# Patient Record
Sex: FEMALE | Race: BLACK OR AFRICAN AMERICAN | Employment: FULL TIME | URBAN - METROPOLITAN AREA
[De-identification: names, ages, dates, MRNs, and addresses within clinical notes are randomized per-mention and may not be internally consistent; named-entity substitution may affect disease eponyms.]

---

## 2021-04-08 ENCOUNTER — TRANSCRIBE ORDER (OUTPATIENT)
Dept: SCHEDULING | Age: 66
End: 2021-04-08

## 2021-04-08 DIAGNOSIS — I25.10 CORONARY ATHEROSCLEROSIS OF NATIVE CORONARY ARTERY: Primary | ICD-10-CM

## 2021-04-08 DIAGNOSIS — I25.3 ANEURYSM OF HEART WALL: ICD-10-CM

## 2021-04-15 ENCOUNTER — TRANSCRIBE ORDER (OUTPATIENT)
Dept: SCHEDULING | Age: 66
End: 2021-04-15

## 2021-04-15 DIAGNOSIS — I71.20 ANEURYSM, AORTA, THORACIC: Primary | ICD-10-CM

## 2021-04-16 ENCOUNTER — TRANSCRIBE ORDER (OUTPATIENT)
Dept: SCHEDULING | Age: 66
End: 2021-04-16

## 2021-04-16 DIAGNOSIS — I71.20 THORACIC AORTIC ANEURYSM: Primary | ICD-10-CM

## 2021-04-27 ENCOUNTER — HOSPITAL ENCOUNTER (OUTPATIENT)
Dept: CT IMAGING | Age: 66
Discharge: HOME | End: 2021-04-27
Payer: MEDICARE

## 2021-04-27 DIAGNOSIS — I25.10 CORONARY ATHEROSCLEROSIS OF NATIVE CORONARY ARTERY: ICD-10-CM

## 2021-04-27 DIAGNOSIS — I25.3 ANEURYSM OF HEART WALL: ICD-10-CM

## 2021-04-27 LAB — CREAT BLD-MCNC: 0.9 MG/DL (ref 0.6–1.3)

## 2021-04-27 PROCEDURE — 71275 CT ANGIOGRAPHY CHEST: CPT

## 2021-04-27 PROCEDURE — 74011000636 HC RX REV CODE- 636

## 2021-04-27 PROCEDURE — 82565 ASSAY OF CREATININE: CPT

## 2021-04-27 RX ADMIN — IOPAMIDOL 100 ML: 755 INJECTION, SOLUTION INTRAVENOUS at 09:08

## 2021-04-30 ENCOUNTER — TRANSCRIBE ORDER (OUTPATIENT)
Dept: SCHEDULING | Age: 66
End: 2021-04-30

## 2021-04-30 DIAGNOSIS — Z12.31 OTHER SCREENING MAMMOGRAM: Primary | ICD-10-CM

## 2021-07-12 ENCOUNTER — TRANSCRIBE ORDER (OUTPATIENT)
Dept: SCHEDULING | Age: 66
End: 2021-07-12

## 2021-07-12 DIAGNOSIS — Q21.0 VENTRICULAR SEPTAL DEFECT: Primary | ICD-10-CM

## 2021-07-20 ENCOUNTER — TRANSCRIBE ORDER (OUTPATIENT)
Dept: SCHEDULING | Age: 66
End: 2021-07-20

## 2021-07-20 DIAGNOSIS — Q21.0 VENTRICULAR SEPTAL DEFECT: Primary | ICD-10-CM

## 2021-07-28 ENCOUNTER — HOSPITAL ENCOUNTER (OUTPATIENT)
Dept: NON INVASIVE DIAGNOSTICS | Age: 66
Discharge: HOME | End: 2021-07-28
Attending: INTERNAL MEDICINE
Payer: MEDICARE

## 2021-07-28 ENCOUNTER — HOSPITAL ENCOUNTER (EMERGENCY)
Age: 66
Discharge: STILL ADMITTED OR ADMINISTRATIVE | End: 2021-07-28
Attending: EMERGENCY MEDICINE

## 2021-07-28 VITALS — HEIGHT: 65 IN | BODY MASS INDEX: 30.32 KG/M2 | WEIGHT: 182 LBS

## 2021-07-28 DIAGNOSIS — Q21.0 VENTRICULAR SEPTAL DEFECT: ICD-10-CM

## 2021-07-28 PROCEDURE — 93306 TTE W/DOPPLER COMPLETE: CPT | Performed by: SPECIALIST

## 2021-07-28 PROCEDURE — 93306 TTE W/DOPPLER COMPLETE: CPT

## 2021-07-29 LAB
ECHO AO ROOT DIAM: 2.5 CM
ECHO AV AREA PLAN: 1.89 CM2
ECHO LA AREA 4C: 17.98 CM2
ECHO LA MAJOR AXIS: 3.45 CM
ECHO LA MINOR AXIS: 1.82 CM
ECHO LA VOL 4C: 43.75 ML (ref 22–52)
ECHO LA VOLUME INDEX A4C: 23.03 ML/M2 (ref 16–28)
ECHO LV EDV A4C: 122.19 ML
ECHO LV EDV INDEX A4C: 64.3 ML/M2
ECHO LV EJECTION FRACTION A4C: 81 PERCENT
ECHO LV ESV A4C: 23.02 ML
ECHO LV ESV INDEX A4C: 12.1 ML/M2
ECHO LV INTERNAL DIMENSION DIASTOLIC: 4.06 CM (ref 3.9–5.3)
ECHO LV INTERNAL DIMENSION SYSTOLIC: 2.01 CM
ECHO LV IVSD: 1.05 CM (ref 0.6–0.9)
ECHO LV MASS 2D: 132.8 G (ref 67–162)
ECHO LV MASS INDEX 2D: 69.9 G/M2 (ref 43–95)
ECHO LV POSTERIOR WALL DIASTOLIC: 0.98 CM (ref 0.6–0.9)
ECHO LVOT DIAM: 1.82 CM
ECHO LVOT PEAK GRADIENT: 3.81 MMHG
ECHO LVOT PEAK VELOCITY: 97.61 CM/S
ECHO MV A VELOCITY: 67.54 CM/S
ECHO MV AREA PHT: 3.86 CM2
ECHO MV AREA PHT: 4.89 CM2
ECHO MV AREA PLAN: 4.81 CM2
ECHO MV E DECELERATION TIME (DT): 196.52 MS
ECHO MV E VELOCITY: 40.35 CM/S
ECHO MV E/A RATIO: 0.6
ECHO MV MAX VELOCITY: 94.78 CM/S
ECHO MV MEAN GRADIENT: 1.09 MMHG
ECHO MV PEAK GRADIENT: 3.59 MMHG
ECHO MV PRESSURE HALF TIME (PHT): 45.01 MS
ECHO MV PRESSURE HALF TIME (PHT): 56.99 MS
ECHO MV VTI: 17.88 CM
ECHO PV PEAK INSTANTANEOUS GRADIENT SYSTOLIC: 3.04 MMHG
ECHO RA AREA 4C: 12.54 CM2
ECHO TV REGURGITANT MAX VELOCITY: 132.48 CM/S

## 2021-08-06 ENCOUNTER — HOSPITAL ENCOUNTER (OUTPATIENT)
Dept: PREADMISSION TESTING | Age: 66
Discharge: HOME | End: 2021-08-06

## 2022-04-29 ENCOUNTER — HOSPITAL ENCOUNTER (OUTPATIENT)
Dept: MAMMOGRAPHY | Age: 67
Discharge: HOME | End: 2022-04-29
Payer: MEDICARE

## 2022-04-29 DIAGNOSIS — Z12.31 OTHER SCREENING MAMMOGRAM: ICD-10-CM

## 2022-04-29 PROCEDURE — 77067 SCR MAMMO BI INCL CAD: CPT

## 2022-09-11 ENCOUNTER — ANESTHESIA EVENT (OUTPATIENT)
Dept: ENDOSCOPY | Age: 67
End: 2022-09-11
Payer: MEDICARE

## 2022-09-11 NOTE — ANESTHESIA PREPROCEDURE EVALUATION
Relevant Problems   No relevant active problems       Anesthetic History   No history of anesthetic complications            Review of Systems / Medical History  Patient summary reviewed, nursing notes reviewed and pertinent labs reviewed    Pulmonary  Within defined limits                 Neuro/Psych   Within defined limits           Cardiovascular      Valvular problems/murmurs: aortic insufficiency            Exercise tolerance: >4 METS  Comments: 60-65% EF with mild AI by ECHO 7/28/21   GI/Hepatic/Renal  Within defined limits              Endo/Other        Arthritis     Other Findings   Comments: Screening colonoscopy           Physical Exam    Airway  Mallampati: I  TM Distance: > 6 cm  Neck ROM: normal range of motion   Mouth opening: Normal     Cardiovascular    Rhythm: regular  Rate: normal         Dental    Dentition: Poor dentition  Comments:  Many missing, denies any loose   Pulmonary  Breath sounds clear to auscultation               Abdominal  GI exam deferred       Other Findings            Anesthetic Plan    ASA: 2  Anesthesia type: total IV anesthesia and general          Induction: Intravenous  Anesthetic plan and risks discussed with: Patient

## 2022-09-12 ENCOUNTER — ANESTHESIA (OUTPATIENT)
Dept: ENDOSCOPY | Age: 67
End: 2022-09-12
Payer: MEDICARE

## 2022-09-12 ENCOUNTER — HOSPITAL ENCOUNTER (OUTPATIENT)
Age: 67
Setting detail: OUTPATIENT SURGERY
Discharge: HOME | End: 2022-09-12
Attending: INTERNAL MEDICINE | Admitting: INTERNAL MEDICINE
Payer: MEDICARE

## 2022-09-12 VITALS
SYSTOLIC BLOOD PRESSURE: 147 MMHG | TEMPERATURE: 97.5 F | DIASTOLIC BLOOD PRESSURE: 85 MMHG | HEART RATE: 59 BPM | RESPIRATION RATE: 19 BRPM | OXYGEN SATURATION: 100 %

## 2022-09-12 PROCEDURE — 76060000031 HC ANESTHESIA FIRST 0.5 HR: Performed by: INTERNAL MEDICINE

## 2022-09-12 PROCEDURE — 74011250636 HC RX REV CODE- 250/636: Performed by: NURSE ANESTHETIST, CERTIFIED REGISTERED

## 2022-09-12 PROCEDURE — 74011250637 HC RX REV CODE- 250/637: Performed by: INTERNAL MEDICINE

## 2022-09-12 PROCEDURE — 74011000258 HC RX REV CODE- 258: Performed by: NURSE ANESTHETIST, CERTIFIED REGISTERED

## 2022-09-12 PROCEDURE — 74011250636 HC RX REV CODE- 250/636: Performed by: INTERNAL MEDICINE

## 2022-09-12 PROCEDURE — 2709999900 HC NON-CHARGEABLE SUPPLY: Performed by: INTERNAL MEDICINE

## 2022-09-12 PROCEDURE — 74011000250 HC RX REV CODE- 250: Performed by: NURSE ANESTHETIST, CERTIFIED REGISTERED

## 2022-09-12 PROCEDURE — 76040000019: Performed by: INTERNAL MEDICINE

## 2022-09-12 RX ORDER — DEXTROMETHORPHAN/PSEUDOEPHED 2.5-7.5/.8
1.2 DROPS ORAL
Status: DISCONTINUED | OUTPATIENT
Start: 2022-09-12 | End: 2022-09-12 | Stop reason: HOSPADM

## 2022-09-12 RX ORDER — FLUMAZENIL 0.1 MG/ML
0.2 INJECTION INTRAVENOUS
Status: DISCONTINUED | OUTPATIENT
Start: 2022-09-12 | End: 2022-09-12 | Stop reason: HOSPADM

## 2022-09-12 RX ORDER — SODIUM CHLORIDE 9 MG/ML
75 INJECTION, SOLUTION INTRAVENOUS CONTINUOUS
Status: DISCONTINUED | OUTPATIENT
Start: 2022-09-12 | End: 2022-09-12 | Stop reason: HOSPADM

## 2022-09-12 RX ORDER — LOSARTAN POTASSIUM 25 MG/1
25 TABLET ORAL DAILY
COMMUNITY

## 2022-09-12 RX ORDER — LIDOCAINE HYDROCHLORIDE 20 MG/ML
INJECTION, SOLUTION EPIDURAL; INFILTRATION; INTRACAUDAL; PERINEURAL AS NEEDED
Status: DISCONTINUED | OUTPATIENT
Start: 2022-09-12 | End: 2022-09-12 | Stop reason: HOSPADM

## 2022-09-12 RX ORDER — ATROPINE SULFATE 0.1 MG/ML
0.5 INJECTION INTRAVENOUS
Status: DISCONTINUED | OUTPATIENT
Start: 2022-09-12 | End: 2022-09-12 | Stop reason: HOSPADM

## 2022-09-12 RX ORDER — MECLIZINE HYDROCHLORIDE 25 MG/1
25 TABLET ORAL
COMMUNITY

## 2022-09-12 RX ORDER — EPINEPHRINE 0.1 MG/ML
1 INJECTION INTRACARDIAC; INTRAVENOUS
Status: DISCONTINUED | OUTPATIENT
Start: 2022-09-12 | End: 2022-09-12 | Stop reason: HOSPADM

## 2022-09-12 RX ORDER — SODIUM CHLORIDE 0.9 % (FLUSH) 0.9 %
5-40 SYRINGE (ML) INJECTION EVERY 8 HOURS
Status: DISCONTINUED | OUTPATIENT
Start: 2022-09-12 | End: 2022-09-12 | Stop reason: HOSPADM

## 2022-09-12 RX ORDER — SODIUM CHLORIDE 9 MG/ML
INJECTION, SOLUTION INTRAVENOUS
Status: DISCONTINUED | OUTPATIENT
Start: 2022-09-12 | End: 2022-09-12 | Stop reason: HOSPADM

## 2022-09-12 RX ORDER — PROPOFOL 10 MG/ML
INJECTION, EMULSION INTRAVENOUS AS NEEDED
Status: DISCONTINUED | OUTPATIENT
Start: 2022-09-12 | End: 2022-09-12 | Stop reason: HOSPADM

## 2022-09-12 RX ORDER — NALOXONE HYDROCHLORIDE 0.4 MG/ML
0.4 INJECTION, SOLUTION INTRAMUSCULAR; INTRAVENOUS; SUBCUTANEOUS
Status: DISCONTINUED | OUTPATIENT
Start: 2022-09-12 | End: 2022-09-12 | Stop reason: HOSPADM

## 2022-09-12 RX ORDER — SODIUM CHLORIDE 0.9 % (FLUSH) 0.9 %
5-40 SYRINGE (ML) INJECTION AS NEEDED
Status: DISCONTINUED | OUTPATIENT
Start: 2022-09-12 | End: 2022-09-12 | Stop reason: HOSPADM

## 2022-09-12 RX ADMIN — PROPOFOL 50 MG: 10 INJECTION, EMULSION INTRAVENOUS at 10:09

## 2022-09-12 RX ADMIN — PROPOFOL 20 MG: 10 INJECTION, EMULSION INTRAVENOUS at 10:12

## 2022-09-12 RX ADMIN — SODIUM CHLORIDE: 9 INJECTION, SOLUTION INTRAVENOUS at 10:06

## 2022-09-12 RX ADMIN — SODIUM CHLORIDE 75 ML/HR: 9 INJECTION, SOLUTION INTRAVENOUS at 10:05

## 2022-09-12 RX ADMIN — PROPOFOL 130 MG: 10 INJECTION, EMULSION INTRAVENOUS at 10:05

## 2022-09-12 RX ADMIN — SIMETHICONE 80 MG: 20 SUSPENSION/ DROPS ORAL at 10:12

## 2022-09-12 RX ADMIN — LIDOCAINE HYDROCHLORIDE 40 MG: 20 INJECTION, SOLUTION EPIDURAL; INFILTRATION; INTRACAUDAL; PERINEURAL at 10:05

## 2022-09-12 NOTE — ANESTHESIA POSTPROCEDURE EVALUATION
Procedure(s):  COLONOSCOPY.    total IV anesthesia, general    Anesthesia Post Evaluation        Patient location during evaluation: PACU  Note status: Adequate. Level of consciousness: responsive to verbal stimuli and sleepy but conscious  Pain management: satisfactory to patient  Airway patency: patent  Anesthetic complications: no  Cardiovascular status: acceptable  Respiratory status: acceptable  Hydration status: acceptable  Comments: +Post-Anesthesia Evaluation and Assessment    Patient: Evelin Leon MRN: [id number]  SSN: [id number]   YOB: 1955  Age: 75 y.o. Sex: female      Cardiovascular Function/Vital Signs    BP (!) 147/85   Pulse (!) 59   Temp 36.4 °C (97.5 °F)   Resp 19   SpO2 100%     Patient is status post Procedure(s):  COLONOSCOPY. Nausea/Vomiting: Controlled. Postoperative hydration reviewed and adequate. Pain:  Pain Scale 1: Numeric (0 - 10) (09/12/22 1030)  Pain Intensity 1: 0 (09/12/22 1030)   Managed. Neurological Status: At baseline. Mental Status and Level of Consciousness: Arousable. Pulmonary Status:   O2 Device: None (Room air) (09/12/22 1030)   Adequate oxygenation and airway patent. Complications related to anesthesia: None    Post-anesthesia assessment completed. No concerns. Signed By: Aruna Nielson MD    9/12/2022  Post anesthesia nausea and vomiting:  controlled      INITIAL Post-op Vital signs:   Vitals Value Taken Time   /85 09/12/22 1030   Temp 36.4 °C (97.5 °F) 09/12/22 1022   Pulse 56 09/12/22 1034   Resp 19 09/12/22 1034   SpO2 99 % 09/12/22 1034   Vitals shown include unvalidated device data.

## 2022-09-12 NOTE — PROGRESS NOTES
Endoscope was pre-cleaned at bedside immediately following procedure by SHENA Riley Glasses returned to patient post procedure.
Reno Zaragoza  1955  [id number]    Situation:  Verbal report received from: Mendel Kayser, RN  Procedure: Procedure(s):  COLONOSCOPY    Background:    Preoperative diagnosis: screen  Postoperative diagnosis: Hemorrhoids     :  Dr. Mana Lancaster  Assistant(s): Endoscopy Technician-1: Sana Serrano  Endoscopy RN-1: Yanely Hendrickson    Specimens: * No specimens in log *  H. Pylori  no    Assessment:  Intra-procedure medications     Anesthesia gave intra-procedure sedation and medications, see anesthesia flow sheet yes    Intravenous fluids: NS@ KVO     Vital signs stable yes    Abdominal assessment: round and soft yes    Recommendation:  Discharge patient per MD order yes.   Return to floor n/a  Family or Brelechandra Chairezon, son  Permission to share finding with family or friend yes
Adult

## 2022-09-12 NOTE — DISCHARGE INSTRUCTIONS
Wilda Do  [id number]  1955    COLON DISCHARGE INSTRUCTIONS  Discomfort:  Redness at IV site- apply warm compress to area; if redness or soreness persist- contact your physician  There may be a slight amount of blood passed from the rectum  Gaseous discomfort- walking, belching will help relieve any discomfort  You may not operate a vehicle for 12 hours  You may not engage in an occupation involving machinery or appliances for rest of today  You may not drink alcoholic beverages for at least 12 hours  Avoid making any critical decisions for at least 24 hour  DIET:   Regular diet. - however -  remember your colon is empty and a heavy meal will produce gas. Avoid these foods:  vegetables, fried / greasy foods, carbonated drinks for today  MEDICATION:  Per Medication Reconciliation       ACTIVITY:  You may not resume your normal daily activities until tomorrow AM; it is recommended that you spend the remainder of the day resting -  avoid any strenuous activity. CALL M.D. ANY SIGN OF:   Increasing pain, nausea, vomiting  Abdominal distension (swelling)  New increased bleeding (oral or rectal)  Fever (chills)  Pain in chest area  Bloody discharge from nose or mouth  Shortness of breath    You may  take any Advil, Aspirin, Ibuprofen, Motrin, Aleve, or Goodys for 10 days, ONLY  Tylenol as needed for pain.     IMPRESSION:  Impression:    Normal colonoscopy through to the cecum  Medium sized internal hemorrhoids seen on retroflexion    Recommendations:   Consider repeat colonoscopy in 10 years for average risk screening based on overall health status    Follow-up Instructions:  Telephone # [phone]      Pancho Mast MD

## 2022-09-12 NOTE — PROCEDURES
NAME:  Estevan Duque   :   1955   MRN:   [id number]     Date/Time:  2022 10:17 AM    Colonoscopy Operative Report    Procedure Type:   Colonoscopy --screening     Indications:     Screening colonoscopy  Pre-operative Diagnosis: see indication above  Post-operative Diagnosis:  See findings below  :  Pat Gutierrez MD  Referring Provider: --Maryanne Arguello MD    Exam:  Airway: clear, no airway problems anticipated  Heart: RRR, without gallops or rubs  Lungs: clear bilaterally without wheezes, crackles, or rhonchi  Abdomen: soft, nontender, nondistended, bowel sounds present  Mental Status: awake, alert and oriented to person, place and time    Sedation:  MAC anesthesia Propofol  Procedure Details:  After informed consent was obtained with all risks and benefits of procedure explained and preoperative exam completed, the patient was taken to the endoscopy suite and placed in the left lateral decubitus position. Upon sequential sedation as per above, a digital rectal exam was performed demonstrating internal hemorrhoids. The Olympus videocolonoscope  was inserted in the rectum and carefully advanced to the cecum, which was identified by the ileocecal valve and appendiceal orifice. The quality of preparation was fair. The colonoscope was slowly withdrawn with careful evaluation between folds. Retroflexion in the rectum was completed demonstrating internal hemorrhoids. Findings:   Normal colonoscopy through to the cecum  Medium sized internal hemorrhoids seen on retroflexion    Specimen Removed:  None  Complications: None. EBL:  None. Impression:    Normal colonoscopy through to the cecum  Medium sized internal hemorrhoids seen on retroflexion    Recommendations:   Consider repeat colonoscopy in 10 years for average risk screening based on overall health status    Discharge Disposition:  Home in the company of a  when able to ambulate.       Radha Rosa MD

## 2022-09-12 NOTE — H&P
Gastroenterology Outpatient History and Physical    Patient: Reno Zaragoza    Physician: Sobia Ibarra MD    Chief Complaint: CRC screening  History of Present Illness: No GI complaints    History:  Past Medical History:   Diagnosis Date    Arthritis       Past Surgical History:   Procedure Laterality Date    HX  SECTION      HX CYST REMOVAL Bilateral     HX GYN        Social History     Socioeconomic History    Marital status: SINGLE   Tobacco Use    Smoking status: Never    Smokeless tobacco: Never   Vaping Use    Vaping Use: Never used   Substance and Sexual Activity    Alcohol use: Yes     Comment: social    Drug use: Never      Family History   Problem Relation Age of Onset    Breast Cancer Paternal Aunt     There is no problem list on file for this patient. Allergies: No Known Allergies  Medications:   Prior to Admission medications    Medication Sig Start Date End Date Taking? Authorizing Provider   ibuprofen (MOTRIN) 800 mg tablet Take 800 mg by mouth every eight (8) hours as needed. Other, MD Ruslan     Physical Exam:   Vital Signs: There were no vitals taken for this visit.   General: well developed, well nourished   HEENT: unremarkable   Heart: regular rhythm no mumur    Lungs: clear   Abdominal:  benign   Neurological: unremarkable   Extremities: no edema     Findings/Diagnosis: CRC screening  Plan of Care/Planned Procedure: Colonoscopy with conscious/deep sedation    Signed:  Sobia Ibarra MD 2022

## 2023-04-26 ENCOUNTER — TRANSCRIBE ORDERS (OUTPATIENT)
Facility: HOSPITAL | Age: 68
End: 2023-04-26

## 2023-04-26 DIAGNOSIS — Z12.31 BREAST CANCER SCREENING BY MAMMOGRAM: Primary | ICD-10-CM

## 2023-12-07 ENCOUNTER — TRANSCRIBE ORDERS (OUTPATIENT)
Facility: HOSPITAL | Age: 68
End: 2023-12-07

## 2023-12-07 DIAGNOSIS — M81.0 OSTEOPOROSIS, UNSPECIFIED OSTEOPOROSIS TYPE, UNSPECIFIED PATHOLOGICAL FRACTURE PRESENCE: Primary | ICD-10-CM

## 2023-12-07 DIAGNOSIS — Z12.31 VISIT FOR SCREENING MAMMOGRAM: ICD-10-CM

## 2024-01-02 ENCOUNTER — HOSPITAL ENCOUNTER (OUTPATIENT)
Facility: HOSPITAL | Age: 69
Discharge: HOME | End: 2024-01-05
Payer: MEDICARE

## 2024-01-02 VITALS — BODY MASS INDEX: 29.99 KG/M2 | WEIGHT: 180 LBS | HEIGHT: 65 IN

## 2024-01-02 DIAGNOSIS — M81.0 OSTEOPOROSIS, UNSPECIFIED OSTEOPOROSIS TYPE, UNSPECIFIED PATHOLOGICAL FRACTURE PRESENCE: ICD-10-CM

## 2024-01-02 DIAGNOSIS — Z12.31 VISIT FOR SCREENING MAMMOGRAM: ICD-10-CM

## 2024-01-02 PROCEDURE — 77063 BREAST TOMOSYNTHESIS BI: CPT

## 2024-01-02 PROCEDURE — 77080 DXA BONE DENSITY AXIAL: CPT

## 2024-02-09 ENCOUNTER — TRANSCRIBE ORDERS (OUTPATIENT)
Facility: HOSPITAL | Age: 69
End: 2024-02-09

## 2024-02-09 DIAGNOSIS — R10.9 PAIN, ABDOMINAL, NONSPECIFIC: Primary | ICD-10-CM

## 2024-02-23 ENCOUNTER — HOSPITAL ENCOUNTER (OUTPATIENT)
Facility: HOSPITAL | Age: 69
End: 2024-02-23
Payer: MEDICARE

## 2024-02-23 DIAGNOSIS — R10.9 PAIN, ABDOMINAL, NONSPECIFIC: ICD-10-CM

## 2024-02-23 PROCEDURE — 76830 TRANSVAGINAL US NON-OB: CPT

## 2024-02-23 PROCEDURE — 76856 US EXAM PELVIC COMPLETE: CPT

## 2024-12-30 ENCOUNTER — TRANSCRIBE ORDERS (OUTPATIENT)
Facility: HOSPITAL | Age: 69
End: 2024-12-30

## 2024-12-30 DIAGNOSIS — Z12.31 BREAST CANCER SCREENING BY MAMMOGRAM: Primary | ICD-10-CM

## (undated) DEVICE — BASIN EMSIS 16OZ GRAPHITE PLAS KID SHP MOLD GRAD FOR ORAL

## (undated) DEVICE — NEONATAL-ADULT SPO2 SENSOR: Brand: NELLCOR

## (undated) DEVICE — Device

## (undated) DEVICE — 1200 GUARD II KIT W/5MM TUBE W/O VAC TUBE: Brand: GUARDIAN

## (undated) DEVICE — SYR 3ML LL TIP 1/10ML GRAD --

## (undated) DEVICE — CATH IV AUTOGRD BC PNK 20GA 25 -- INSYTE

## (undated) DEVICE — Z DISCONTINUED PER MEDLINE LINE GAS SAMPLING O2/CO2 LNG AD 13 FT NSL W/ TBNG FILTERLINE

## (undated) DEVICE — HYPODERMIC SAFETY NEEDLE: Brand: MAGELLAN

## (undated) DEVICE — TOWEL 4 PLY TISS 19X30 SUE WHT

## (undated) DEVICE — SOLIDIFIER FLD 2OZ 1500CC N DISINF IN BTL DISP SAFESORB

## (undated) DEVICE — SET ADMIN 16ML TBNG L100IN 2 Y INJ SITE IV PIGGY BK DISP

## (undated) DEVICE — SYR 10ML LUER LOK 1/5ML GRAD --

## (undated) DEVICE — ELECTRODE,RADIOTRANSLUCENT,FOAM,5PK: Brand: MEDLINE